# Patient Record
Sex: FEMALE | URBAN - METROPOLITAN AREA
[De-identification: names, ages, dates, MRNs, and addresses within clinical notes are randomized per-mention and may not be internally consistent; named-entity substitution may affect disease eponyms.]

---

## 2018-02-28 NOTE — PATIENT DISCUSSION
H/O GESTATIONAL DIABETES WITHOUT RETINOPATHY- NO LONGER CONSIDERED DIABETIC:PATIENT INSTRUCTED TO RETURN TO PCP FOR CONTINUED BLOOD SUGAR MONITORING AND RETURN FOR FOLLOW-UP AS SCHEDULED FOR DILATED EXAM.

## 2018-08-10 PROBLEM — K57.92 ACUTE DIVERTICULITIS: Status: ACTIVE | Noted: 2018-08-10

## 2018-08-10 PROBLEM — K56.600 PARTIAL BOWEL OBSTRUCTION (HCC): Status: ACTIVE | Noted: 2018-08-10

## 2021-04-02 ENCOUNTER — IMPORTED ENCOUNTER (OUTPATIENT)
Dept: URBAN - METROPOLITAN AREA CLINIC 1 | Facility: CLINIC | Age: 51
End: 2021-04-02

## 2021-04-02 PROBLEM — H52.13: Noted: 2021-04-02

## 2021-04-02 PROBLEM — H52.223: Noted: 2021-04-02

## 2021-04-02 PROBLEM — H52.4: Noted: 2021-04-02

## 2021-04-02 PROCEDURE — S0620 ROUTINE OPHTHALMOLOGICAL EXA: HCPCS

## 2021-04-02 NOTE — PATIENT DISCUSSION
1. Myopia w/ Astigmatism OU -- Rx was given for correction if indicated and requested. 2. Presbyopia 3. Nuclear Cataract OU -- Observe. 4.  Dry Eyes OU -- Recommended the use of ATs BID OU routinely (Sample of Refresh for Contacts given). CTL trials given to patient ([facility]). Return for an appointment in 1 week cc with Dr. Bubba Haddad.

## 2021-04-09 ENCOUNTER — IMPORTED ENCOUNTER (OUTPATIENT)
Dept: URBAN - METROPOLITAN AREA CLINIC 1 | Facility: CLINIC | Age: 51
End: 2021-04-09

## 2021-04-09 NOTE — PATIENT DISCUSSION
1. CC Today -- Finalized CTL Rx and given to patient ([facility] MF). Good comfort fit and vision. Recommended ATs for contacts TID OU (Sample of Refresh for Contacts given). Return for an appointment in 1 year 40/cc with Dr. Joni Schaumann.

## 2022-02-10 ENCOUNTER — APPOINTMENT (OUTPATIENT)
Dept: GENERAL RADIOLOGY | Age: 52
End: 2022-02-10
Attending: STUDENT IN AN ORGANIZED HEALTH CARE EDUCATION/TRAINING PROGRAM
Payer: COMMERCIAL

## 2022-02-10 ENCOUNTER — HOSPITAL ENCOUNTER (EMERGENCY)
Age: 52
Discharge: HOME | End: 2022-02-11
Attending: STUDENT IN AN ORGANIZED HEALTH CARE EDUCATION/TRAINING PROGRAM
Payer: COMMERCIAL

## 2022-02-10 VITALS
HEART RATE: 115 BPM | TEMPERATURE: 98.9 F | HEIGHT: 63 IN | BODY MASS INDEX: 19.14 KG/M2 | DIASTOLIC BLOOD PRESSURE: 72 MMHG | WEIGHT: 108 LBS | RESPIRATION RATE: 23 BRPM | SYSTOLIC BLOOD PRESSURE: 114 MMHG | OXYGEN SATURATION: 100 %

## 2022-02-10 DIAGNOSIS — Z72.0 TOBACCO ABUSE: ICD-10-CM

## 2022-02-10 DIAGNOSIS — J45.21 INTERMITTENT ASTHMA WITH ACUTE EXACERBATION, UNSPECIFIED ASTHMA SEVERITY: Primary | ICD-10-CM

## 2022-02-10 DIAGNOSIS — K57.92 DIVERTICULITIS: ICD-10-CM

## 2022-02-10 LAB
ANION GAP SERPL CALC-SCNC: 6 MMOL/L (ref 3–18)
ARTERIAL PATENCY WRIST A: POSITIVE
BASE DEFICIT BLD-SCNC: 0.5 MMOL/L
BASOPHILS # BLD: 0 K/UL (ref 0–0.1)
BASOPHILS NFR BLD: 0 % (ref 0–2)
BDY SITE: ABNORMAL
BUN SERPL-MCNC: 15 MG/DL (ref 7–18)
BUN/CREAT SERPL: 22 (ref 12–20)
CALCIUM SERPL-MCNC: 8.7 MG/DL (ref 8.5–10.1)
CHLORIDE SERPL-SCNC: 108 MMOL/L (ref 100–111)
CO2 SERPL-SCNC: 27 MMOL/L (ref 21–32)
CREAT SERPL-MCNC: 0.68 MG/DL (ref 0.6–1.3)
DIFFERENTIAL METHOD BLD: ABNORMAL
EOSINOPHIL # BLD: 0.2 K/UL (ref 0–0.4)
EOSINOPHIL NFR BLD: 2 % (ref 0–5)
ERYTHROCYTE [DISTWIDTH] IN BLOOD BY AUTOMATED COUNT: 13.3 % (ref 11.6–14.5)
GAS FLOW.O2 O2 DELIVERY SYS: ABNORMAL L/MIN
GAS FLOW.O2 SETTING OXYMISER: 8 BPM
GLUCOSE SERPL-MCNC: 115 MG/DL (ref 74–99)
HCO3 BLD-SCNC: 25.2 MMOL/L (ref 22–26)
HCT VFR BLD AUTO: 42.6 % (ref 35–45)
HGB BLD-MCNC: 14 G/DL (ref 12–16)
IMM GRANULOCYTES # BLD AUTO: 0 K/UL (ref 0–0.04)
IMM GRANULOCYTES NFR BLD AUTO: 0 % (ref 0–0.5)
LYMPHOCYTES # BLD: 3.9 K/UL (ref 0.9–3.6)
LYMPHOCYTES NFR BLD: 33 % (ref 21–52)
MCH RBC QN AUTO: 31.1 PG (ref 24–34)
MCHC RBC AUTO-ENTMCNC: 32.9 G/DL (ref 31–37)
MCV RBC AUTO: 94.7 FL (ref 78–100)
MONOCYTES # BLD: 0.6 K/UL (ref 0.05–1.2)
MONOCYTES NFR BLD: 5 % (ref 3–10)
NEUTS SEG # BLD: 6.9 K/UL (ref 1.8–8)
NEUTS SEG NFR BLD: 59 % (ref 40–73)
NRBC # BLD: 0 K/UL (ref 0–0.01)
NRBC BLD-RTO: 0 PER 100 WBC
O2/TOTAL GAS SETTING VFR VENT: 50 %
PCO2 BLD: 44.1 MMHG (ref 35–45)
PEEP RESPIRATORY: 8 CMH2O
PH BLD: 7.37 [PH] (ref 7.35–7.45)
PLATELET # BLD AUTO: 130 K/UL (ref 135–420)
PMV BLD AUTO: 11.2 FL (ref 9.2–11.8)
PO2 BLD: 162 MMHG (ref 80–100)
POTASSIUM SERPL-SCNC: 3.2 MMOL/L (ref 3.5–5.5)
PRESSURE SUPPORT SETTING VENT: 14 CMH2O
RBC # BLD AUTO: 4.5 M/UL (ref 4.2–5.3)
SAO2 % BLD: 99.4 % (ref 92–97)
SERVICE CMNT-IMP: ABNORMAL
SODIUM SERPL-SCNC: 141 MMOL/L (ref 136–145)
SPECIMEN TYPE: ABNORMAL
TOTAL RESP. RATE, ITRR: 18
TROPONIN-HIGH SENSITIVITY: 14 NG/L (ref 0–54)
VENTILATION MODE VENT: ABNORMAL
VT SETTING VENT: 663 ML
WBC # BLD AUTO: 11.6 K/UL (ref 4.6–13.2)

## 2022-02-10 PROCEDURE — 74011250636 HC RX REV CODE- 250/636: Performed by: STUDENT IN AN ORGANIZED HEALTH CARE EDUCATION/TRAINING PROGRAM

## 2022-02-10 PROCEDURE — 94640 AIRWAY INHALATION TREATMENT: CPT

## 2022-02-10 PROCEDURE — 36600 WITHDRAWAL OF ARTERIAL BLOOD: CPT

## 2022-02-10 PROCEDURE — 85025 COMPLETE CBC W/AUTO DIFF WBC: CPT

## 2022-02-10 PROCEDURE — 74011000250 HC RX REV CODE- 250: Performed by: STUDENT IN AN ORGANIZED HEALTH CARE EDUCATION/TRAINING PROGRAM

## 2022-02-10 PROCEDURE — 80048 BASIC METABOLIC PNL TOTAL CA: CPT

## 2022-02-10 PROCEDURE — 82803 BLOOD GASES ANY COMBINATION: CPT

## 2022-02-10 PROCEDURE — 83735 ASSAY OF MAGNESIUM: CPT

## 2022-02-10 PROCEDURE — 71045 X-RAY EXAM CHEST 1 VIEW: CPT

## 2022-02-10 PROCEDURE — 84484 ASSAY OF TROPONIN QUANT: CPT

## 2022-02-10 PROCEDURE — 96375 TX/PRO/DX INJ NEW DRUG ADDON: CPT

## 2022-02-10 PROCEDURE — 96374 THER/PROPH/DIAG INJ IV PUSH: CPT

## 2022-02-10 PROCEDURE — 93005 ELECTROCARDIOGRAM TRACING: CPT

## 2022-02-10 PROCEDURE — 99285 EMERGENCY DEPT VISIT HI MDM: CPT

## 2022-02-10 RX ORDER — IPRATROPIUM BROMIDE AND ALBUTEROL SULFATE 2.5; .5 MG/3ML; MG/3ML
3 SOLUTION RESPIRATORY (INHALATION) CONTINUOUS
Status: DISCONTINUED | OUTPATIENT
Start: 2022-02-10 | End: 2022-02-10

## 2022-02-10 RX ORDER — FENTANYL CITRATE 50 UG/ML
25 INJECTION, SOLUTION INTRAMUSCULAR; INTRAVENOUS
Status: COMPLETED | OUTPATIENT
Start: 2022-02-10 | End: 2022-02-10

## 2022-02-10 RX ORDER — BUDESONIDE AND FORMOTEROL FUMARATE DIHYDRATE 80; 4.5 UG/1; UG/1
1 AEROSOL RESPIRATORY (INHALATION)
Qty: 10.2 G | Refills: 0 | Status: SHIPPED | OUTPATIENT
Start: 2022-02-10 | End: 2022-02-20

## 2022-02-10 RX ORDER — POTASSIUM CHLORIDE 20 MEQ/1
40 TABLET, EXTENDED RELEASE ORAL 2 TIMES DAILY
Status: DISCONTINUED | OUTPATIENT
Start: 2022-02-11 | End: 2022-02-11 | Stop reason: HOSPADM

## 2022-02-10 RX ORDER — IPRATROPIUM BROMIDE AND ALBUTEROL SULFATE 2.5; .5 MG/3ML; MG/3ML
3 SOLUTION RESPIRATORY (INHALATION)
Status: DISCONTINUED | OUTPATIENT
Start: 2022-02-10 | End: 2022-02-10

## 2022-02-10 RX ORDER — EPINEPHRINE 0.3 MG/.3ML
0.3 INJECTION SUBCUTANEOUS
Qty: 1 EACH | Refills: 0 | Status: SHIPPED | OUTPATIENT
Start: 2022-02-10 | End: 2022-02-10

## 2022-02-10 RX ORDER — PREDNISONE 50 MG/1
50 TABLET ORAL DAILY
Qty: 5 TABLET | Refills: 0 | Status: SHIPPED | OUTPATIENT
Start: 2022-02-10 | End: 2022-02-15

## 2022-02-10 RX ORDER — KETAMINE HCL 50MG/ML(1)
0.25 SYRINGE (ML) INTRAVENOUS ONCE
Status: COMPLETED | OUTPATIENT
Start: 2022-02-10 | End: 2022-02-10

## 2022-02-10 RX ADMIN — ALBUTEROL SULFATE 1 DOSE: 2.5 SOLUTION RESPIRATORY (INHALATION) at 20:51

## 2022-02-10 RX ADMIN — ALBUTEROL SULFATE 1 DOSE: 2.5 SOLUTION RESPIRATORY (INHALATION) at 21:03

## 2022-02-10 RX ADMIN — FENTANYL CITRATE 25 MCG: 50 INJECTION, SOLUTION INTRAMUSCULAR; INTRAVENOUS at 20:37

## 2022-02-10 RX ADMIN — ALBUTEROL SULFATE 1 DOSE: 2.5 SOLUTION RESPIRATORY (INHALATION) at 21:11

## 2022-02-10 RX ADMIN — Medication 12.5 MG: at 21:09

## 2022-02-11 LAB
ATRIAL RATE: 109 BPM
CALCULATED P AXIS, ECG09: 78 DEGREES
CALCULATED R AXIS, ECG10: 72 DEGREES
CALCULATED T AXIS, ECG11: 65 DEGREES
DIAGNOSIS, 93000: NORMAL
MAGNESIUM SERPL-MCNC: 2.2 MG/DL (ref 1.6–2.6)
P-R INTERVAL, ECG05: 156 MS
Q-T INTERVAL, ECG07: 340 MS
QRS DURATION, ECG06: 86 MS
QTC CALCULATION (BEZET), ECG08: 457 MS
VENTRICULAR RATE, ECG03: 109 BPM

## 2022-02-11 NOTE — DISCHARGE INSTRUCTIONS
Today you were seen for acute asthma exacerbation. You had labs, an EKG and a chest x-ray. You are also treated with respiratory support, and steroids, and a DuoNeb treatment, and low-dose fentanyl and ketamine. You had improvements in your oxygenation, and you are able to be transitioned to room air. Your labs were reassuring against the need for acute hospitalization or surgery. Your EKG and your chest repeat straight were also reassuring. You are being discharged with medication for acute asthma exacerbation including oral and inhaled steroids to assure that the inflammatory process in your lung does not recur causing acute shortness of breath. You are also being discharged with an epinephrine pen in case of acute allergic reaction with chest tightness, inability to breathe, rash, nausea or vomiting. Is very important to follow-up with your primary care provider in 1 to 2 weeks, as well as an allergist and/or pulmonologist what ever is indicated by your primary care physician if applicable. .  They may ask you to continue your low-dose inhaled corticosteroid or change your asthma medications depending on your complete asthma history. Please return immediately to the nearest emergency department for any worsening symptoms, questions or concerns regarding your symptoms especially shortness of breath, nausea, vomiting, fever, shortness of breath, or chest pain.

## 2022-02-11 NOTE — PROGRESS NOTES
02/10/22 2111   Oxygen Therapy   O2 Sat (%) 100 %   Pulse via Oximetry 107 beats per minute   O2 Device BIPAP   FIO2 (%) 30 %   Pre-Treatment   Breathing Pattern Regular   Breath Sounds Bilateral Clear   Respirations 19   Post-Treatment   Left Breath Sounds Rhonchi;Scattered wheezing; Lower; Posterior   Procedures   $$ Subsequent Procedure Aerosol   Delivery Source In-line nebulizer   Aerosolized Medications Albuterol;Ipratropium bromide       All treatments given. Patient breath sounds clear with some Rhonchi and scattered wheezed noted in the LLL. Patient states she has had a productive cough of clear mucus. Patient states she is feeling \"much better\".

## 2022-02-11 NOTE — PROGRESS NOTES
02/10/22 2028   Oxygen Therapy   O2 Sat (%) 99 %   Pulse via Oximetry 128 beats per minute   CPAP/BIPAP   CPAP/BIPAP Start/Stop On   Device Mode BIPAP;S/T   Mask Type and Size Full face;Small   PIP Observed 15 cm H20   IPAP (cm H2O) 14 cm H2O   EPAP (cm H2O) 8 cm H2O   Inspiratory Time (sec) 0.9 seconds   Vt Spont (ml) 545 ml   Ve Observed (l/min) 12.5 l/min   Backup Rate 8   Total RR (Spontaneous) 24 breaths per minute   Insp Rise Time (sec) 3   Leak (Estimated) 75 L/min   Pt's Home Machine No   Biomedical Check Performed Yes   Settings Verified Yes   Alarm Settings   High Pressure 40   Low Pressure 8   Apnea 20   Low Ve 2   High Rate 40   Low Rate 8       Patient arrived via ambulance on CPAP with acute asthma exacerbation. Placed on bipap with above settings. Patient WOB decreased significantly after placed on bipap. Breathing treatments being given through bipap.
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
## 2022-02-11 NOTE — ED TRIAGE NOTES
Pt arrives via EMS with c/o difficulty breathing. Stated she was using a new shampoo on her pet & began to feel very short of breath. Hx of asthma; stated she used her inhaler twice & \"it didn't help at all\". Alert & oriented upon arrival & able to speak in full sentences. RT placed pt on bipap upon arrival. Blood drawn for labs; labeled & sent for processing.

## 2022-04-02 ASSESSMENT — VISUAL ACUITY
OS_CC: J1
OD_SC: 20/20-1
OD_SC: 20/25+1
OS_SC: 20/20-1
OS_SC: 20/20
OD_CC: J1

## 2022-04-02 ASSESSMENT — KERATOMETRY
OD_K1POWER_DIOPTERS: 44.50
OD_K2POWER_DIOPTERS: 46.75
OS_AXISANGLE_DEGREES: 011
OS_K2POWER_DIOPTERS: 46.00
OD_AXISANGLE_DEGREES: 168
OS_K1POWER_DIOPTERS: 45.00
OD_AXISANGLE2_DEGREES: 078
OS_AXISANGLE2_DEGREES: 101

## 2022-04-02 ASSESSMENT — TONOMETRY
OS_IOP_MMHG: 19
OD_IOP_MMHG: 19

## 2022-07-01 ENCOUNTER — COMPREHENSIVE EXAM (OUTPATIENT)
Dept: URBAN - METROPOLITAN AREA CLINIC 1 | Facility: CLINIC | Age: 52
End: 2022-07-01

## 2022-07-01 DIAGNOSIS — H52.223: ICD-10-CM

## 2022-07-01 DIAGNOSIS — H52.13: ICD-10-CM

## 2022-07-01 DIAGNOSIS — H52.4: ICD-10-CM

## 2022-07-01 PROCEDURE — 92310 CONTACT LENS FITTING OU: CPT

## 2022-07-01 PROCEDURE — 92015 DETERMINE REFRACTIVE STATE: CPT

## 2022-07-01 PROCEDURE — 92014 COMPRE OPH EXAM EST PT 1/>: CPT

## 2022-07-01 ASSESSMENT — VISUAL ACUITY
OS_CC: 20/25
OD_CC: J4
OD_CC: 20/40
OS_CC: J10

## 2022-07-01 ASSESSMENT — TONOMETRY
OD_IOP_MMHG: 18
OS_IOP_MMHG: 18

## 2022-07-01 NOTE — PATIENT DISCUSSION
Patient given Rx for glasses. Changes made to CTLs from MF to [facility] only. Patient understands will need OTC readers over top of CTLs for fine print.

## 2022-07-22 ENCOUNTER — CONTACT LENSES/GLASSES VISIT (OUTPATIENT)
Dept: URBAN - METROPOLITAN AREA CLINIC 1 | Facility: CLINIC | Age: 52
End: 2022-07-22

## 2022-07-22 DIAGNOSIS — Z46.0: ICD-10-CM

## 2022-07-22 PROCEDURE — 92310-F CONTACT LENS FITTING FOLLOW UP

## 2022-07-22 ASSESSMENT — VISUAL ACUITY
OD_CC: 20/20
OD_CC: J16
OS_CC: J16
OS_CC: 20/20

## 2022-07-22 NOTE — PATIENT DISCUSSION
Patient not happy with comfort. Happy with distance [facility] but would prefer to go back into a  for [facility]. Finalized a B&L  Toric today.  Order trials today & ok to order if happy with CTL Trial.